# Patient Record
Sex: MALE | Race: WHITE | NOT HISPANIC OR LATINO | Employment: FULL TIME | URBAN - METROPOLITAN AREA
[De-identification: names, ages, dates, MRNs, and addresses within clinical notes are randomized per-mention and may not be internally consistent; named-entity substitution may affect disease eponyms.]

---

## 2019-07-05 ENCOUNTER — HOSPITAL ENCOUNTER (OUTPATIENT)
Dept: CARDIOLOGY | Facility: CLINIC | Age: 44
Discharge: HOME | End: 2019-07-05
Payer: COMMERCIAL

## 2019-07-05 DIAGNOSIS — Z00.00 ROUTINE GENERAL MEDICAL EXAMINATION AT A HEALTH CARE FACILITY: ICD-10-CM

## 2019-07-05 DIAGNOSIS — Z00.00 ROUTINE GENERAL MEDICAL EXAMINATION AT A HEALTH CARE FACILITY: Primary | ICD-10-CM

## 2019-07-05 PROCEDURE — 93010 ELECTROCARDIOGRAM REPORT: CPT | Mod: S$PBB,,, | Performed by: INTERNAL MEDICINE

## 2019-07-05 PROCEDURE — 93010 EKG 12-LEAD: ICD-10-PCS | Mod: S$PBB,,, | Performed by: INTERNAL MEDICINE

## 2019-07-05 PROCEDURE — 93005 ELECTROCARDIOGRAM TRACING: CPT | Mod: PBBFAC | Performed by: INTERNAL MEDICINE

## 2019-07-12 ENCOUNTER — OFFICE VISIT (OUTPATIENT)
Dept: INTERNAL MEDICINE | Facility: CLINIC | Age: 44
End: 2019-07-12
Payer: COMMERCIAL

## 2019-07-12 ENCOUNTER — CLINICAL SUPPORT (OUTPATIENT)
Dept: INTERNAL MEDICINE | Facility: CLINIC | Age: 44
End: 2019-07-12
Payer: COMMERCIAL

## 2019-07-12 VITALS
DIASTOLIC BLOOD PRESSURE: 58 MMHG | BODY MASS INDEX: 29.97 KG/M2 | WEIGHT: 190.94 LBS | HEIGHT: 67 IN | HEART RATE: 56 BPM | SYSTOLIC BLOOD PRESSURE: 98 MMHG

## 2019-07-12 DIAGNOSIS — Z00.00 ROUTINE GENERAL MEDICAL EXAMINATION AT A HEALTH CARE FACILITY: Primary | ICD-10-CM

## 2019-07-12 DIAGNOSIS — Z00.00 ANNUAL PHYSICAL EXAM: Primary | ICD-10-CM

## 2019-07-12 LAB
ALBUMIN SERPL BCP-MCNC: 4 G/DL (ref 3.5–5.2)
ALP SERPL-CCNC: 50 U/L (ref 55–135)
ALT SERPL W/O P-5'-P-CCNC: 25 U/L (ref 10–44)
ANION GAP SERPL CALC-SCNC: 6 MMOL/L (ref 8–16)
AST SERPL-CCNC: 30 U/L (ref 10–40)
BILIRUB SERPL-MCNC: 0.8 MG/DL (ref 0.1–1)
BUN SERPL-MCNC: 16 MG/DL (ref 6–20)
CALCIUM SERPL-MCNC: 10.2 MG/DL (ref 8.7–10.5)
CHLORIDE SERPL-SCNC: 106 MMOL/L (ref 95–110)
CHOLEST SERPL-MCNC: 162 MG/DL (ref 120–199)
CHOLEST/HDLC SERPL: 2.9 {RATIO} (ref 2–5)
CO2 SERPL-SCNC: 29 MMOL/L (ref 23–29)
COMPLEXED PSA SERPL-MCNC: 0.4 NG/ML (ref 0–4)
CREAT SERPL-MCNC: 1.1 MG/DL (ref 0.5–1.4)
ERYTHROCYTE [DISTWIDTH] IN BLOOD BY AUTOMATED COUNT: 12.8 % (ref 11.5–14.5)
EST. GFR  (AFRICAN AMERICAN): >60 ML/MIN/1.73 M^2
EST. GFR  (NON AFRICAN AMERICAN): >60 ML/MIN/1.73 M^2
GLUCOSE SERPL-MCNC: 106 MG/DL (ref 70–110)
HCT VFR BLD AUTO: 45 % (ref 40–54)
HDLC SERPL-MCNC: 56 MG/DL (ref 40–75)
HDLC SERPL: 34.6 % (ref 20–50)
HGB BLD-MCNC: 15.5 G/DL (ref 14–18)
HIV 1+2 AB+HIV1 P24 AG SERPL QL IA: NEGATIVE
LDLC SERPL CALC-MCNC: 94.4 MG/DL (ref 63–159)
MCH RBC QN AUTO: 32.4 PG (ref 27–31)
MCHC RBC AUTO-ENTMCNC: 34.4 G/DL (ref 32–36)
MCV RBC AUTO: 94 FL (ref 82–98)
NONHDLC SERPL-MCNC: 106 MG/DL
PLATELET # BLD AUTO: 255 K/UL (ref 150–350)
PMV BLD AUTO: 11.1 FL (ref 9.2–12.9)
POTASSIUM SERPL-SCNC: 5.2 MMOL/L (ref 3.5–5.1)
PROT SERPL-MCNC: 6.8 G/DL (ref 6–8.4)
RBC # BLD AUTO: 4.79 M/UL (ref 4.6–6.2)
SODIUM SERPL-SCNC: 141 MMOL/L (ref 136–145)
TRIGL SERPL-MCNC: 58 MG/DL (ref 30–150)
WBC # BLD AUTO: 6.76 K/UL (ref 3.9–12.7)

## 2019-07-12 PROCEDURE — 85027 COMPLETE CBC AUTOMATED: CPT

## 2019-07-12 PROCEDURE — 80053 COMPREHEN METABOLIC PANEL: CPT

## 2019-07-12 PROCEDURE — 99999 PR PBB SHADOW E&M-EST. PATIENT-LVL III: ICD-10-PCS | Mod: PBBFAC,,, | Performed by: INTERNAL MEDICINE

## 2019-07-12 PROCEDURE — 86703 HIV-1/HIV-2 1 RESULT ANTBDY: CPT

## 2019-07-12 PROCEDURE — 99386 PR PREVENTIVE VISIT,NEW,40-64: ICD-10-PCS | Mod: S$PBB,,, | Performed by: INTERNAL MEDICINE

## 2019-07-12 PROCEDURE — 80061 LIPID PANEL: CPT

## 2019-07-12 PROCEDURE — 99386 PREV VISIT NEW AGE 40-64: CPT | Mod: S$PBB,,, | Performed by: INTERNAL MEDICINE

## 2019-07-12 PROCEDURE — 99999 PR PBB SHADOW E&M-EST. PATIENT-LVL III: CPT | Mod: PBBFAC,,, | Performed by: INTERNAL MEDICINE

## 2019-07-12 PROCEDURE — 84153 ASSAY OF PSA TOTAL: CPT

## 2019-07-12 NOTE — PATIENT INSTRUCTIONS
Here are the results of your recent labs and tests.   Any significant abnormalities have been discussed and those recommendations are found in the diagnosis list on this visit summary.    Your Chest xray was: Normal  Your EKG was normal and did not show any concerning changes.     Recent Results (from the past 168 hour(s))   Comprehensive metabolic panel    Collection Time: 07/12/19  9:43 AM   Result Value Ref Range    Sodium 141 136 - 145 mmol/L    Potassium 5.2 (H) 3.5 - 5.1 mmol/L    Chloride 106 95 - 110 mmol/L    CO2 29 23 - 29 mmol/L    Glucose 106 70 - 110 mg/dL    BUN, Bld 16 6 - 20 mg/dL    Creatinine 1.1 0.5 - 1.4 mg/dL    Calcium 10.2 8.7 - 10.5 mg/dL    Total Protein 6.8 6.0 - 8.4 g/dL    Albumin 4.0 3.5 - 5.2 g/dL    Total Bilirubin 0.8 0.1 - 1.0 mg/dL    Alkaline Phosphatase 50 (L) 55 - 135 U/L    AST 30 10 - 40 U/L    ALT 25 10 - 44 U/L    Anion Gap 6 (L) 8 - 16 mmol/L    eGFR if African American >60.0 >60 mL/min/1.73 m^2    eGFR if non African American >60.0 >60 mL/min/1.73 m^2   CBC Without Differential    Collection Time: 07/12/19  9:43 AM   Result Value Ref Range    WBC 6.76 3.90 - 12.70 K/uL    RBC 4.79 4.60 - 6.20 M/uL    Hemoglobin 15.5 14.0 - 18.0 g/dL    Hematocrit 45.0 40.0 - 54.0 %    Mean Corpuscular Volume 94 82 - 98 fL    Mean Corpuscular Hemoglobin 32.4 (H) 27.0 - 31.0 pg    Mean Corpuscular Hemoglobin Conc 34.4 32.0 - 36.0 g/dL    RDW 12.8 11.5 - 14.5 %    Platelets 255 150 - 350 K/uL    MPV 11.1 9.2 - 12.9 fL   Lipid panel    Collection Time: 07/12/19  9:43 AM   Result Value Ref Range    Cholesterol 162 120 - 199 mg/dL    Triglycerides 58 30 - 150 mg/dL    HDL 56 40 - 75 mg/dL    LDL Cholesterol 94.4 63.0 - 159.0 mg/dL    Hdl/Cholesterol Ratio 34.6 20.0 - 50.0 %    Total Cholesterol/HDL Ratio 2.9 2.0 - 5.0    Non-HDL Cholesterol 106 mg/dL   HIV 1/2 Ag/Ab (4th Gen)    Collection Time: 07/12/19  9:43 AM   Result Value Ref Range    HIV 1/2 Ag/Ab Negative Negative   PSA, Screening     Collection Time: 07/12/19  9:43 AM   Result Value Ref Range    PSA, SCREEN 0.40 0.00 - 4.00 ng/mL

## 2019-07-12 NOTE — PROGRESS NOTES
"  Anselmo Hylton is a 43 y.o. White male who presents for an Executive health visit with no  other complaints today.   All chronic problems as listed in the active problem list have been stable and well controlled recently. The active problem list was reviewed and reconciled today and is up to date as of today.  There is no problem list on file for this patient.    Other issues addressed today:NONE.   He denies any recent ER visits or hospitalizations.     He states that he is compliant with all prescribed medications and he has experienced no side effects. I have reviewed all current medications and updated the current medication list during this encounter.     PMFSH: all information reviewed and updated today.     All labs and tests from earlier today reviewed. Abnormalities (if any) are addressed in the assessment and plan.     Review of Systems   Constitutional: Negative for chills, fatigue and fever.   HENT: Negative.    Eyes: Negative.    Respiratory: Negative for cough, chest tightness, shortness of breath and wheezing.    Cardiovascular: Negative for chest pain, palpitations and leg swelling.   Gastrointestinal: Negative for abdominal pain, blood in stool, constipation and diarrhea.   Endocrine: Negative.    Genitourinary: Negative.    Musculoskeletal: Negative.    Neurological: Negative for dizziness, tremors, syncope and headaches.   Psychiatric/Behavioral: Negative for agitation and dysphoric mood.     Vitals:    07/12/19 1011   BP: (!) 98/58   BP Location: Left arm   Patient Position: Sitting   BP Method: Large (Automatic)   Pulse: (!) 56   Weight: 86.6 kg (190 lb 14.7 oz)   Height: 5' 7" (1.702 m)    Body mass index is 29.9 kg/m².    Physical Exam   Constitutional: He is oriented to person, place, and time. He appears well-developed and well-nourished.   HENT:   Head: Normocephalic and atraumatic.   Right Ear: External ear normal.   Left Ear: External ear normal.   Nose: Nose normal.   Mouth/Throat: " Oropharynx is clear and moist.   Eyes: Pupils are equal, round, and reactive to light. EOM are normal.   Neck: Neck supple. No JVD present. No thyromegaly present.   Cardiovascular: Normal rate, regular rhythm, normal heart sounds and intact distal pulses.   Pulmonary/Chest: Effort normal and breath sounds normal. He has no wheezes. He has no rales.   Abdominal: Soft. Bowel sounds are normal. He exhibits no mass. There is no tenderness.   Musculoskeletal: Normal range of motion.   Lymphadenopathy:     He has no cervical adenopathy.   Neurological: He is alert and oriented to person, place, and time. He has normal reflexes.   Psychiatric: He has a normal mood and affect. His behavior is normal.   Vitals reviewed.    ASSESSMENT/PLAN:  1. Routine general medical examination at a health care facility  Normal healthy male.  Continue with healthy lifestyle and have routine check up with PCP.   Goals Addressed                 This Visit's Progress     Blood Pressure < 130/80   98/58     BMI is less than 27        Exercise at least 150 minutes per week.        LDL CHOLESTEROL < 160   94.4

## 2019-07-12 NOTE — LETTER
July 12, 2019     Anselmo Hylton  [address] [place] [address]    Dear Mr. Hylton    Thank you for allowing me to serve you and perform your Executive Health exam on 7/12/2019.  Attached to this letter you will find a copy of your After Visit Summary which includes a copy of all labs and other results, your current medical problem list (if any), a list of all diagnoses from today (if any), a list of all current medication, your vital signs (including Blood pressure, Height, weight, BMI) and associated plans/goals that we discussed while you were in the office.     If you have any questions please feel free to contact the office.           EARNEST Montgomery II, MD

## 2020-12-01 ENCOUNTER — OFFICE VISIT (OUTPATIENT)
Dept: URGENT CARE | Facility: CLINIC | Age: 45
End: 2020-12-01
Payer: COMMERCIAL

## 2020-12-01 ENCOUNTER — LAB VISIT (OUTPATIENT)
Dept: PRIMARY CARE CLINIC | Facility: OTHER | Age: 45
End: 2020-12-01
Attending: INTERNAL MEDICINE
Payer: COMMERCIAL

## 2020-12-01 VITALS
HEART RATE: 72 BPM | OXYGEN SATURATION: 96 % | SYSTOLIC BLOOD PRESSURE: 127 MMHG | BODY MASS INDEX: 30.61 KG/M2 | WEIGHT: 195 LBS | HEIGHT: 67 IN | TEMPERATURE: 100 F | DIASTOLIC BLOOD PRESSURE: 78 MMHG

## 2020-12-01 DIAGNOSIS — R50.9 FEVER: ICD-10-CM

## 2020-12-01 DIAGNOSIS — U07.1 COVID-19 VIRUS DETECTED: ICD-10-CM

## 2020-12-01 DIAGNOSIS — U07.1 COVID-19: Primary | ICD-10-CM

## 2020-12-01 DIAGNOSIS — R68.83 CHILLS: ICD-10-CM

## 2020-12-01 LAB
CTP QC/QA: YES
SARS-COV-2 RDRP RESP QL NAA+PROBE: POSITIVE

## 2020-12-01 PROCEDURE — 99214 OFFICE O/P EST MOD 30 MIN: CPT | Mod: S$GLB,,, | Performed by: PHYSICIAN ASSISTANT

## 2020-12-01 PROCEDURE — U0003 INFECTIOUS AGENT DETECTION BY NUCLEIC ACID (DNA OR RNA); SEVERE ACUTE RESPIRATORY SYNDROME CORONAVIRUS 2 (SARS-COV-2) (CORONAVIRUS DISEASE [COVID-19]), AMPLIFIED PROBE TECHNIQUE, MAKING USE OF HIGH THROUGHPUT TECHNOLOGIES AS DESCRIBED BY CMS-2020-01-R: HCPCS

## 2020-12-01 PROCEDURE — 99214 PR OFFICE/OUTPT VISIT, EST, LEVL IV, 30-39 MIN: ICD-10-PCS | Mod: S$GLB,,, | Performed by: PHYSICIAN ASSISTANT

## 2020-12-01 PROCEDURE — U0002: ICD-10-PCS | Mod: QW,S$GLB,, | Performed by: PHYSICIAN ASSISTANT

## 2020-12-01 PROCEDURE — U0002 COVID-19 LAB TEST NON-CDC: HCPCS | Mod: QW,S$GLB,, | Performed by: PHYSICIAN ASSISTANT

## 2020-12-01 NOTE — PROGRESS NOTES
"Subjective:       Patient ID: Anselmo Hylton is a 45 y.o. male.    Vitals:  height is 5' 7" (1.702 m) and weight is 88.5 kg (195 lb). His oral temperature is 99.5 °F (37.5 °C). His blood pressure is 127/78 and his pulse is 72. His oxygen saturation is 96%.     Chief Complaint: COVID-19 Concerns    Pt denies known exposure to someone with covid-19.    URI   This is a new problem. The current episode started yesterday (last night). The problem has been unchanged. The maximum temperature recorded prior to his arrival was 100.4 - 100.9 F. The fever has been present for 1 to 2 days. Associated symptoms include congestion, coughing, headaches, sinus pain and a sore throat. Pertinent negatives include no abdominal pain, chest pain, diarrhea, dysuria, ear pain, joint pain, joint swelling, nausea, neck pain, plugged ear sensation, rash, rhinorrhea, sneezing, swollen glands, vomiting or wheezing. He has tried NSAIDs for the symptoms. The treatment provided mild relief.       Constitution: Positive for chills, sweating, fatigue and fever.   HENT: Positive for congestion, sinus pain, sinus pressure and sore throat. Negative for ear pain and voice change.    Neck: Negative for neck pain and painful lymph nodes.   Cardiovascular: Negative for chest pain.   Eyes: Negative for eye redness.   Respiratory: Positive for cough and sputum production. Negative for chest tightness, bloody sputum, COPD, shortness of breath, stridor, wheezing and asthma.    Gastrointestinal: Negative for abdominal pain, nausea, vomiting and diarrhea.   Genitourinary: Negative for dysuria.   Musculoskeletal: Positive for muscle ache.   Skin: Negative for rash.   Allergic/Immunologic: Negative for seasonal allergies, asthma and sneezing.   Neurological: Positive for headaches.   Hematologic/Lymphatic: Negative for swollen lymph nodes.       Objective:      Physical Exam   Constitutional: He is oriented to person, place, and time. He appears well-developed. " He is cooperative. He does not appear ill. No distress.   HENT:   Head: Normocephalic and atraumatic.   Ears:   Right Ear: External ear normal.   Left Ear: External ear normal.   Nose: Nose normal.   Mouth/Throat: Oropharynx is clear and moist.   Eyes: Conjunctivae, EOM and lids are normal.   Neck: Trachea normal, full passive range of motion without pain and phonation normal. Neck supple.   Cardiovascular: Normal rate.   Pulmonary/Chest: Effort normal. No respiratory distress.   Musculoskeletal: Normal range of motion.   Neurological: He is alert and oriented to person, place, and time.   Skin: Skin is warm, dry, intact and not diaphoretic. Psychiatric: His speech is normal and behavior is normal. Judgment and thought content normal.   Nursing note and vitals reviewed.        Assessment:       1. COVID-19        Office Visit on 12/01/2020   Component Date Value Ref Range Status    POC Rapid COVID 12/01/2020 Positive* Negative Final     Acceptable 12/01/2020 Yes   Final     Plan:       Called patient with positive COVID results. Patient denies shortness of breath. Offered to examine patient using PPE in exam room or to complete visit over the phone. Patient agrees with visit over the phone to limit exposure. Observed patient from afar when walking in and out of the clinic. ER and quarantine precautions discussed.    COVID-19  -     POCT COVID-19 Rapid Screening      Patient Instructions   You have tested positive for COVID-19 today.  Per the CDC, you are now in a 10 day isolation.      This isolation starts from the day you first developed symptoms, not the day of your positive test. For example, if your symptoms began on a Monday, and you waited until Friday of the same week to get tested, and it was positive, your 10 day isolation begins from that Monday, not the Friday you tested positive.       However, if you are asymptomatic (a person who does not have any symptoms), and received a COVID-19  test that was positive, your 10 day isolation begins on the day you tested positive.  This is regardless if you were exposed to a known positive days earlier.  So for example, if you test positive as an asymptomatic on day 7 from exposure, you have now extended your 14 day quarantine to a 17 day quarantine.        Also, per the CDC guidelines, once your 10 days have passed, and you have not had fever greater than 100.4F in the last 24 hours without taking any fever reducers such as Tylenol (Acetaminophen) or Motrin (Ibuprofen), you may return to your normal activities including social distancing, wearing masks, and frequent handwashing - YOU DO NOT NEED ANOTHER TEST, OR TO TEST NEGATIVE, IN ORDER TO END YOUR QUARANTINE!

## 2020-12-03 LAB — SARS-COV-2 RNA RESP QL NAA+PROBE: DETECTED

## 2021-04-12 ENCOUNTER — PATIENT MESSAGE (OUTPATIENT)
Dept: RESEARCH | Facility: HOSPITAL | Age: 46
End: 2021-04-12

## 2021-05-14 ENCOUNTER — OFFICE VISIT (OUTPATIENT)
Dept: UROLOGY | Facility: CLINIC | Age: 46
End: 2021-05-14
Attending: UROLOGY
Payer: COMMERCIAL

## 2021-05-14 VITALS
BODY MASS INDEX: 30.61 KG/M2 | SYSTOLIC BLOOD PRESSURE: 112 MMHG | DIASTOLIC BLOOD PRESSURE: 71 MMHG | WEIGHT: 195 LBS | HEART RATE: 49 BPM | HEIGHT: 67 IN

## 2021-05-14 DIAGNOSIS — Z30.09 ENCOUNTER FOR VASECTOMY COUNSELING: Primary | ICD-10-CM

## 2021-05-14 PROCEDURE — 99999 PR PBB SHADOW E&M-EST. PATIENT-LVL III: ICD-10-PCS | Mod: PBBFAC,,, | Performed by: UROLOGY

## 2021-05-14 PROCEDURE — 99999 PR PBB SHADOW E&M-EST. PATIENT-LVL III: CPT | Mod: PBBFAC,,, | Performed by: UROLOGY

## 2021-05-14 PROCEDURE — 99204 PR OFFICE/OUTPT VISIT, NEW, LEVL IV, 45-59 MIN: ICD-10-PCS | Mod: S$GLB,,, | Performed by: UROLOGY

## 2021-05-14 PROCEDURE — 99204 OFFICE O/P NEW MOD 45 MIN: CPT | Mod: S$GLB,,, | Performed by: UROLOGY

## 2021-05-14 RX ORDER — DIAZEPAM 5 MG/1
5 TABLET ORAL ONCE
Qty: 1 TABLET | Refills: 0 | Status: SHIPPED | OUTPATIENT
Start: 2021-05-14 | End: 2021-10-08

## 2021-07-09 ENCOUNTER — PROCEDURE VISIT (OUTPATIENT)
Dept: UROLOGY | Facility: CLINIC | Age: 46
End: 2021-07-09
Payer: COMMERCIAL

## 2021-07-09 VITALS
HEIGHT: 68 IN | BODY MASS INDEX: 29.4 KG/M2 | DIASTOLIC BLOOD PRESSURE: 71 MMHG | WEIGHT: 194 LBS | SYSTOLIC BLOOD PRESSURE: 107 MMHG

## 2021-07-09 DIAGNOSIS — Z30.09 ENCOUNTER FOR VASECTOMY COUNSELING: ICD-10-CM

## 2021-07-09 PROCEDURE — 55250 VASECTOMY: ICD-10-PCS | Mod: S$GLB,,, | Performed by: UROLOGY

## 2021-07-09 PROCEDURE — 55250 REMOVAL OF SPERM DUCT(S): CPT | Mod: S$GLB,,, | Performed by: UROLOGY

## 2021-07-09 RX ORDER — LIDOCAINE HYDROCHLORIDE AND EPINEPHRINE 20; 10 MG/ML; UG/ML
10 INJECTION, SOLUTION INFILTRATION; PERINEURAL
Status: COMPLETED | OUTPATIENT
Start: 2021-07-09 | End: 2021-07-09

## 2021-07-09 RX ADMIN — LIDOCAINE HYDROCHLORIDE AND EPINEPHRINE 10 ML: 20; 10 INJECTION, SOLUTION INFILTRATION; PERINEURAL at 09:07

## 2021-09-30 ENCOUNTER — TELEPHONE (OUTPATIENT)
Dept: UROLOGY | Facility: CLINIC | Age: 46
End: 2021-09-30

## 2021-10-08 ENCOUNTER — OFFICE VISIT (OUTPATIENT)
Dept: UROLOGY | Facility: CLINIC | Age: 46
End: 2021-10-08
Payer: COMMERCIAL

## 2021-10-08 VITALS — HEIGHT: 68 IN | BODY MASS INDEX: 29.5 KG/M2

## 2021-10-08 DIAGNOSIS — Z30.09 ENCOUNTER FOR VASECTOMY COUNSELING: Primary | ICD-10-CM

## 2021-10-08 PROCEDURE — 99999 PR PBB SHADOW E&M-EST. PATIENT-LVL II: CPT | Mod: PBBFAC,,, | Performed by: UROLOGY

## 2021-10-08 PROCEDURE — 99499 NO LOS: ICD-10-PCS | Mod: S$GLB,,, | Performed by: UROLOGY

## 2021-10-08 PROCEDURE — 99499 UNLISTED E&M SERVICE: CPT | Mod: S$GLB,,, | Performed by: UROLOGY

## 2021-10-08 PROCEDURE — 99999 PR PBB SHADOW E&M-EST. PATIENT-LVL II: ICD-10-PCS | Mod: PBBFAC,,, | Performed by: UROLOGY

## 2022-05-16 ENCOUNTER — LAB VISIT (OUTPATIENT)
Dept: LAB | Facility: HOSPITAL | Age: 47
End: 2022-05-16
Attending: NURSE PRACTITIONER
Payer: COMMERCIAL

## 2022-05-16 ENCOUNTER — OFFICE VISIT (OUTPATIENT)
Dept: INTERNAL MEDICINE | Facility: CLINIC | Age: 47
End: 2022-05-16
Payer: COMMERCIAL

## 2022-05-16 DIAGNOSIS — M79.675 GREAT TOE PAIN, LEFT: ICD-10-CM

## 2022-05-16 DIAGNOSIS — M10.9 ACUTE GOUT INVOLVING TOE OF LEFT FOOT, UNSPECIFIED CAUSE: Primary | ICD-10-CM

## 2022-05-16 DIAGNOSIS — M10.9 ACUTE GOUT INVOLVING TOE OF LEFT FOOT, UNSPECIFIED CAUSE: ICD-10-CM

## 2022-05-16 PROCEDURE — 99999 PR PBB SHADOW E&M-EST. PATIENT-LVL III: ICD-10-PCS | Mod: PBBFAC,,, | Performed by: NURSE PRACTITIONER

## 2022-05-16 PROCEDURE — 85025 COMPLETE CBC W/AUTO DIFF WBC: CPT | Performed by: NURSE PRACTITIONER

## 2022-05-16 PROCEDURE — 96372 PR INJECTION,THERAP/PROPH/DIAG2ST, IM OR SUBCUT: ICD-10-PCS | Mod: S$GLB,,, | Performed by: NURSE PRACTITIONER

## 2022-05-16 PROCEDURE — 36415 COLL VENOUS BLD VENIPUNCTURE: CPT | Performed by: NURSE PRACTITIONER

## 2022-05-16 PROCEDURE — 96372 THER/PROPH/DIAG INJ SC/IM: CPT | Mod: S$GLB,,, | Performed by: NURSE PRACTITIONER

## 2022-05-16 PROCEDURE — 99214 PR OFFICE/OUTPT VISIT, EST, LEVL IV, 30-39 MIN: ICD-10-PCS | Mod: 25,S$GLB,, | Performed by: NURSE PRACTITIONER

## 2022-05-16 PROCEDURE — 99214 OFFICE O/P EST MOD 30 MIN: CPT | Mod: 25,S$GLB,, | Performed by: NURSE PRACTITIONER

## 2022-05-16 PROCEDURE — 80053 COMPREHEN METABOLIC PANEL: CPT | Performed by: NURSE PRACTITIONER

## 2022-05-16 PROCEDURE — 84550 ASSAY OF BLOOD/URIC ACID: CPT | Performed by: NURSE PRACTITIONER

## 2022-05-16 PROCEDURE — 99999 PR PBB SHADOW E&M-EST. PATIENT-LVL III: CPT | Mod: PBBFAC,,, | Performed by: NURSE PRACTITIONER

## 2022-05-16 RX ORDER — TRIAMCINOLONE ACETONIDE 40 MG/ML
40 INJECTION, SUSPENSION INTRA-ARTICULAR; INTRAMUSCULAR ONCE
Status: COMPLETED | OUTPATIENT
Start: 2022-05-16 | End: 2022-05-16

## 2022-05-16 RX ORDER — COLCHICINE 0.6 MG/1
TABLET ORAL
Qty: 30 TABLET | Refills: 0 | Status: SHIPPED | OUTPATIENT
Start: 2022-05-16

## 2022-05-16 RX ADMIN — TRIAMCINOLONE ACETONIDE 40 MG: 40 INJECTION, SUSPENSION INTRA-ARTICULAR; INTRAMUSCULAR at 01:05

## 2022-05-16 NOTE — PROGRESS NOTES
[facility] Note    Chief Complaint      Chief Complaint   Patient presents with    Gout     C/o gout flare up in left foot since Wednesday      History of Present Illness      Anselmo Hylton Jr. is a 46 y.o. male patient who is new to me and presents today for establish care visit and complaints of gout flare to left great toe over the past 6 days. Pt reports has not gotten any relief taking advil. No changes in diet, follow a keto type diet, does cross fit. No c/o fever, chills, sob cp, n/v/d. Reports has never been diagnosed with gout  Pt works for [employer]   Topic Date Due    Hepatitis C Screening  Never done    Lipid Panel  07/12/2024    TETANUS VACCINE  07/22/2026       Past Medical History:   Diagnosis Date    Acute head trauma 2013    spent several weeks in hospital and has totally recovered.        Past Surgical History:   Procedure Laterality Date    TONSILLECTOMY         family history includes No Known Problems in his father and mother.     Social History     Tobacco Use    Smoking status: Never Smoker    Smokeless tobacco: Never Used   Substance Use Topics    Alcohol use: Yes    Drug use: No       Review of Systems   Constitutional: Negative for chills, fever and malaise/fatigue.   Eyes: Negative for blurred vision.   Respiratory: Negative for shortness of breath.    Cardiovascular: Negative for chest pain.   Gastrointestinal: Negative for diarrhea, nausea and vomiting.   Musculoskeletal: Positive for joint pain.   Neurological: Positive for tingling. Negative for dizziness and headaches.        No outpatient encounter medications on file as of 5/16/2022.     Facility-Administered Encounter Medications as of 5/16/2022   Medication Dose Route Frequency Provider Last Rate Last Admin    triamcinolone acetonide injection 40 mg  40 mg Intramuscular Once Hannah Reyes NP           Review of patient's allergies indicates:  No Known  "Allergies    Physical Exam      Vital Signs  Temp: (P) 98.5 °F (36.9 °C)  Pulse: (P) 65  SpO2: (P) 98 %  BP: (P) 110/74  Pain Score: 10-Worst pain ever  Height and Weight  Height: (P) 5' 8" (172.7 cm)  Weight: (P) 92 kg (202 lb 13.2 oz)  BSA (Calculated - sq m): (P) 2.1 sq meters  BMI (Calculated): (P) 30.8  Weight in (lb) to have BMI = 25: (P) 164.1    Physical Exam  Vitals and nursing note reviewed.   Constitutional:       General: He is not in acute distress.     Appearance: Normal appearance. He is not ill-appearing.   HENT:      Head: Normocephalic and atraumatic.   Cardiovascular:      Rate and Rhythm: Normal rate and regular rhythm.      Heart sounds: Normal heart sounds.   Pulmonary:      Effort: Pulmonary effort is normal. No respiratory distress.   Musculoskeletal:      Comments: Redness and warmth to left medial great toe joint. Pain with palpitation   Skin:     General: Skin is warm and dry.   Neurological:      Mental Status: He is alert and oriented to person, place, and time.   Psychiatric:         Mood and Affect: Mood normal.         Behavior: Behavior normal.         Thought Content: Thought content normal.         Judgment: Judgment normal.          Laboratory:  CBC:  Lab Results   Component Value Date    WBC 6.76 07/12/2019    RBC 4.79 07/12/2019    HGB 15.5 07/12/2019    HCT 45.0 07/12/2019     07/12/2019    MCV 94 07/12/2019    MCH 32.4 (H) 07/12/2019    MCHC 34.4 07/12/2019    MCHC 35.9 07/22/2016    MCHC 34.4 05/03/2011     CMP:  Lab Results   Component Value Date     07/12/2019    CALCIUM 10.2 07/12/2019    ALBUMIN 4.0 07/12/2019    PROT 6.8 07/12/2019     07/12/2019    K 5.2 (H) 07/12/2019    CO2 29 07/12/2019     07/12/2019    BUN 16 07/12/2019    ALKPHOS 50 (L) 07/12/2019    ALT 25 07/12/2019    AST 30 07/12/2019    BILITOT 0.8 07/12/2019    BILITOT 0.6 07/22/2016    BILITOT 0.9 05/03/2011     URINALYSIS:  Lab Results   Component Value Date    COLORU YELLOW " 05/03/2011    SPECGRAV 1.017 05/03/2011    PHUR 5.0 05/03/2011    PROTEINUA Negative 05/03/2011    NITRITE Negative 05/03/2011    LEUKOCYTESUR Negative 05/03/2011    UROBILINOGEN 0.2 05/03/2011      LIPIDS:  Lab Results   Component Value Date    HDL 56 07/12/2019    HDL 53 07/22/2016    HDL 48 05/03/2011    CHOL 162 07/12/2019    CHOL 162 07/22/2016    CHOL 155 05/03/2011    TRIG 58 07/12/2019    TRIG 68 07/22/2016    TRIG 53 05/03/2011    LDLCALC 94.4 07/12/2019    LDLCALC 95.4 07/22/2016    LDLCALC 96.4 05/03/2011    CHOLHDL 34.6 07/12/2019    CHOLHDL 32.7 07/22/2016    CHOLHDL 31.0 05/03/2011    NONHDLCHOL 106 07/12/2019    NONHDLCHOL 109 07/22/2016    TOTALCHOLEST 2.9 07/12/2019    TOTALCHOLEST 3.1 07/22/2016    TOTALCHOLEST 3.2 05/03/2011     TSH:  No results found for: TSH  A1C:  No results found for: HGBA1C      Assessment/Plan     Anselmo Hylton Jr. is a 46 y.o.male with:    Acute gout involving toe of left foot, unspecified cause  -     CBC Auto Differential; Future; Expected date: 05/16/2022  -     Comprehensive Metabolic Panel; Future; Expected date: 05/16/2022  -     Uric Acid; Future; Expected date: 05/16/2022  -     triamcinolone acetonide injection 40 mg    Great toe pain, left  -     CBC Auto Differential; Future; Expected date: 05/16/2022  -     Comprehensive Metabolic Panel; Future; Expected date: 05/16/2022  -     Uric Acid; Future; Expected date: 05/16/2022  -     triamcinolone acetonide injection 40 mg    stay hydrated with water  Decrease increased red meat or seafood    I spent  minutes on the day of this encounter for preparing for, evaluating, treating, and managing this patient.        -Continue current medications and maintain follow up with specialists.  Return to clinic as needed for any concerns   No follow-ups on file.      Erin Jiminez, NP-C [facility]

## 2022-05-17 LAB
ALBUMIN SERPL BCP-MCNC: 4.5 G/DL (ref 3.5–5.2)
ALP SERPL-CCNC: 50 U/L (ref 55–135)
ALT SERPL W/O P-5'-P-CCNC: 20 U/L (ref 10–44)
ANION GAP SERPL CALC-SCNC: 10 MMOL/L (ref 8–16)
AST SERPL-CCNC: 23 U/L (ref 10–40)
BASOPHILS # BLD AUTO: 0.02 K/UL (ref 0–0.2)
BASOPHILS NFR BLD: 0.3 % (ref 0–1.9)
BILIRUB SERPL-MCNC: 0.7 MG/DL (ref 0.1–1)
BUN SERPL-MCNC: 19 MG/DL (ref 6–20)
CALCIUM SERPL-MCNC: 10.1 MG/DL (ref 8.7–10.5)
CHLORIDE SERPL-SCNC: 101 MMOL/L (ref 95–110)
CO2 SERPL-SCNC: 27 MMOL/L (ref 23–29)
CREAT SERPL-MCNC: 1.1 MG/DL (ref 0.5–1.4)
DIFFERENTIAL METHOD: ABNORMAL
EOSINOPHIL # BLD AUTO: 0.1 K/UL (ref 0–0.5)
EOSINOPHIL NFR BLD: 1.7 % (ref 0–8)
ERYTHROCYTE [DISTWIDTH] IN BLOOD BY AUTOMATED COUNT: 12 % (ref 11.5–14.5)
EST. GFR  (AFRICAN AMERICAN): >60 ML/MIN/1.73 M^2
EST. GFR  (NON AFRICAN AMERICAN): >60 ML/MIN/1.73 M^2
GLUCOSE SERPL-MCNC: 91 MG/DL (ref 70–110)
HCT VFR BLD AUTO: 45 % (ref 40–54)
HGB BLD-MCNC: 14.7 G/DL (ref 14–18)
IMM GRANULOCYTES # BLD AUTO: 0.02 K/UL (ref 0–0.04)
IMM GRANULOCYTES NFR BLD AUTO: 0.3 % (ref 0–0.5)
LYMPHOCYTES # BLD AUTO: 2.1 K/UL (ref 1–4.8)
LYMPHOCYTES NFR BLD: 29.8 % (ref 18–48)
MCH RBC QN AUTO: 31.6 PG (ref 27–31)
MCHC RBC AUTO-ENTMCNC: 32.7 G/DL (ref 32–36)
MCV RBC AUTO: 97 FL (ref 82–98)
MONOCYTES # BLD AUTO: 0.5 K/UL (ref 0.3–1)
MONOCYTES NFR BLD: 7.1 % (ref 4–15)
NEUTROPHILS # BLD AUTO: 4.2 K/UL (ref 1.8–7.7)
NEUTROPHILS NFR BLD: 60.8 % (ref 38–73)
NRBC BLD-RTO: 0 /100 WBC
PLATELET # BLD AUTO: 258 K/UL (ref 150–450)
PMV BLD AUTO: 12 FL (ref 9.2–12.9)
POTASSIUM SERPL-SCNC: 4.3 MMOL/L (ref 3.5–5.1)
PROT SERPL-MCNC: 7.1 G/DL (ref 6–8.4)
RBC # BLD AUTO: 4.65 M/UL (ref 4.6–6.2)
SODIUM SERPL-SCNC: 138 MMOL/L (ref 136–145)
URATE SERPL-MCNC: 5.6 MG/DL (ref 3.4–7)
WBC # BLD AUTO: 6.88 K/UL (ref 3.9–12.7)

## 2022-05-21 ENCOUNTER — PATIENT MESSAGE (OUTPATIENT)
Dept: INTERNAL MEDICINE | Facility: CLINIC | Age: 47
End: 2022-05-21
Payer: COMMERCIAL

## 2022-06-19 ENCOUNTER — PATIENT MESSAGE (OUTPATIENT)
Dept: INTERNAL MEDICINE | Facility: CLINIC | Age: 47
End: 2022-06-19
Payer: COMMERCIAL

## 2022-06-20 ENCOUNTER — PATIENT MESSAGE (OUTPATIENT)
Dept: INTERNAL MEDICINE | Facility: CLINIC | Age: 47
End: 2022-06-20
Payer: COMMERCIAL